# Patient Record
Sex: MALE | URBAN - METROPOLITAN AREA
[De-identification: names, ages, dates, MRNs, and addresses within clinical notes are randomized per-mention and may not be internally consistent; named-entity substitution may affect disease eponyms.]

---

## 2022-08-02 ENCOUNTER — APPOINTMENT (OUTPATIENT)
Dept: URBAN - METROPOLITAN AREA CLINIC 309 | Age: 57
Setting detail: DERMATOLOGY
End: 2022-08-03

## 2022-08-02 DIAGNOSIS — L91.0 HYPERTROPHIC SCAR: ICD-10-CM

## 2022-08-02 PROCEDURE — OTHER DEFER: OTHER

## 2022-08-02 PROCEDURE — OTHER COUNSELING: OTHER

## 2022-08-02 PROCEDURE — OTHER MIPS QUALITY: OTHER

## 2022-08-02 PROCEDURE — OTHER INTRALESIONAL KENALOG: OTHER

## 2022-08-02 PROCEDURE — 11900 INJECT SKIN LESIONS </W 7: CPT

## 2022-08-02 ASSESSMENT — LOCATION DETAILED DESCRIPTION DERM
LOCATION DETAILED: LEFT ANTERIOR EARLOBE
LOCATION DETAILED: LEFT POSTERIOR EARLOBE

## 2022-08-02 ASSESSMENT — LOCATION ZONE DERM: LOCATION ZONE: EAR

## 2022-08-02 ASSESSMENT — LOCATION SIMPLE DESCRIPTION DERM: LOCATION SIMPLE: LEFT EAR

## 2022-08-02 NOTE — PROCEDURE: INTRALESIONAL KENALOG
Which Kenalog Vial Was Used?: Kenalog 40 mg/ml (1 ml vial)
Detail Level: Detailed
How Many Mls Were Removed From The 40 Mg/Ml (1ml) Vial When Preparing The Injectable Solution?: 0
Total Volume Injected (Ccs- Only Use Numbers And Decimals): 2.0
Validate Note Data When Using Inventory: Yes
Total Volume Injected (Ccs- Only Use Numbers And Decimals): 1
Consent: The risks of atrophy were reviewed with the patient.
Concentration Of Solution Injected (Mg/Ml): 20.0
Medical Necessity Clause: This procedure was medically necessary because the lesions that were treated were:
Include Z78.9 (Other Specified Conditions Influencing Health Status) As An Associated Diagnosis?: No
Total Volume Injected (Ccs- Only Use Numbers And Decimals): 2
Administered By (Optional): Marjorie Esposito MD
Kenalog Preparation: Kenalog
How Many Mls Were Removed From The 40 Mg/Ml (1ml) Vial When Preparing The Injectable Solution?: 0.5
Kenalog Preparation: Kenalog with normal saline
Administered By (Optional): Marjorie Esposito MD

## 2022-08-02 NOTE — PROCEDURE: DEFER
Instructions (Optional): The plan is to perform ILTAC-20mg/mL x 2cc injected into the body of the keloid scar to shrink it over 1-3 treatments spread 4-6 weeks apart, THEN plan for excision with repair. 2 weeks after excision/repair, the pt is to RTC for another round of ILTAC injections to prevent keloid recurrence. After this final post-operative round of ILTAC, if the keloid returns, then will refer to [place] office for SRT. Instructions (Optional): The plan is to perform ILTAC-20mg/mL x 2cc injected into the body of the keloid scar to shrink it over 1-3 treatments spread 4-6 weeks apart, THEN plan for excision with repair. 2 weeks after excision/repair, the pt is to RTC for another round of ILTAC injections to prevent keloid recurrence. After this final post-operative round of ILTAC, if the keloid returns, then will refer to [place] office for SRT.

## 2022-08-30 ENCOUNTER — APPOINTMENT (OUTPATIENT)
Dept: URBAN - METROPOLITAN AREA CLINIC 309 | Age: 57
Setting detail: DERMATOLOGY
End: 2022-08-30

## 2022-08-30 DIAGNOSIS — L91.0 HYPERTROPHIC SCAR: ICD-10-CM

## 2022-08-30 PROCEDURE — 11900 INJECT SKIN LESIONS </W 7: CPT

## 2022-08-30 PROCEDURE — OTHER MIPS QUALITY: OTHER

## 2022-08-30 PROCEDURE — OTHER COUNSELING: OTHER

## 2022-08-30 PROCEDURE — OTHER INTRALESIONAL KENALOG: OTHER

## 2022-08-30 PROCEDURE — OTHER DEFER: OTHER

## 2022-08-30 ASSESSMENT — LOCATION ZONE DERM: LOCATION ZONE: EAR

## 2022-08-30 ASSESSMENT — LOCATION SIMPLE DESCRIPTION DERM: LOCATION SIMPLE: LEFT EAR

## 2022-08-30 ASSESSMENT — LOCATION DETAILED DESCRIPTION DERM
LOCATION DETAILED: LEFT ANTERIOR EARLOBE
LOCATION DETAILED: LEFT POSTERIOR EAR

## 2022-08-30 NOTE — PROCEDURE: MIPS QUALITY
Quality 431: Preventive Care And Screening: Unhealthy Alcohol Use - Screening: Patient not identified as an unhealthy alcohol user when screened for unhealthy alcohol use using a systematic screening method
Quality 134: Screening For Clinical Depression And Follow-Up Plan: The patient was screened for depression and the screen was negative and no follow up required
Quality 226: Preventive Care And Screening: Tobacco Use: Screening And Cessation Intervention: Patient screened for tobacco use and is an ex/non-smoker
Quality 110: Preventive Care And Screening: Influenza Immunization: Influenza immunization was not ordered or administered, reason not given
Quality 400a: One-Time Screening For Hepatitis C Virus (Hcv) For All Patients: Patient received one-time screening for HCV infection
Quality 130: Documentation Of Current Medications In The Medical Record: Current Medications Documented
Detail Level: Detailed

## 2022-08-30 NOTE — PROCEDURE: INTRALESIONAL KENALOG
Medical Necessity Clause: This procedure was medically necessary because the lesions that were treated were lesions of alopecia areata and the standard of care is ILTAC.

## 2022-11-01 ENCOUNTER — APPOINTMENT (OUTPATIENT)
Dept: URBAN - METROPOLITAN AREA CLINIC 309 | Age: 57
Setting detail: DERMATOLOGY
End: 2022-11-01

## 2022-11-01 DIAGNOSIS — L91.0 HYPERTROPHIC SCAR: ICD-10-CM

## 2022-11-01 PROCEDURE — OTHER MEDICATION COUNSELING: OTHER

## 2022-11-01 PROCEDURE — OTHER INTRALESIONAL KENALOG: OTHER

## 2022-11-01 PROCEDURE — OTHER COUNSELING: OTHER

## 2022-11-01 PROCEDURE — OTHER MIPS QUALITY: OTHER

## 2022-11-01 PROCEDURE — 11900 INJECT SKIN LESIONS </W 7: CPT

## 2022-11-01 PROCEDURE — OTHER DEFER: OTHER

## 2022-11-01 PROCEDURE — OTHER INJECTION: OTHER

## 2022-11-01 ASSESSMENT — LOCATION DETAILED DESCRIPTION DERM
LOCATION DETAILED: LEFT INFERIOR POSTAURICULAR SKIN
LOCATION DETAILED: LEFT POSTAURICULAR CREASE
LOCATION DETAILED: LEFT POSTERIOR EARLOBE
LOCATION DETAILED: LEFT POSTERIOR EAR

## 2022-11-01 ASSESSMENT — LOCATION ZONE DERM
LOCATION ZONE: EAR
LOCATION ZONE: SCALP

## 2022-11-01 ASSESSMENT — LOCATION SIMPLE DESCRIPTION DERM
LOCATION SIMPLE: LEFT EAR
LOCATION SIMPLE: POSTERIOR SCALP

## 2022-11-01 NOTE — PROCEDURE: MIPS QUALITY
Detail Level: Detailed
Quality 226: Preventive Care And Screening: Tobacco Use: Screening And Cessation Intervention: Patient screened for tobacco use and is an ex/non-smoker
Quality 431: Preventive Care And Screening: Unhealthy Alcohol Use - Screening: Patient not identified as an unhealthy alcohol user when screened for unhealthy alcohol use using a systematic screening method
Quality 400a: One-Time Screening For Hepatitis C Virus (Hcv) For All Patients: Patient received one-time screening for HCV infection
Quality 110: Preventive Care And Screening: Influenza Immunization: Influenza immunization was not ordered or administered, reason not given
Quality 130: Documentation Of Current Medications In The Medical Record: Current Medications Documented
Quality 134: Screening For Clinical Depression And Follow-Up Plan: The patient was screened for depression and the screen was negative and no follow up required

## 2022-11-01 NOTE — PROCEDURE: MEDICATION COUNSELING
Attending and PA/NP shared services statement (NON-critical care): Clindamycin Counseling: I counseled the patient regarding use of clindamycin as an antibiotic for prophylactic and/or therapeutic purposes. Clindamycin is active against numerous classes of bacteria, including skin bacteria. Side effects may include nausea, diarrhea, gastrointestinal upset, rash, hives, yeast infections, and in rare cases, colitis.

## 2022-11-01 NOTE — PROCEDURE: INJECTION
Additional Comments: Today, for the first time, 0.1cc of 5-FU was mixed with 0.9cc of ILTAC-40mg/mL and the whole 1cc of mixed solution was injected into the left posterior pinna keloid. It is the third round of intralesional injections with the goal of excising and repairing the deliberately torn earlobe at the next visit on a Wednesday morning.

## 2022-11-01 NOTE — PROCEDURE: MEDICATION COUNSELING
I called and left a message for patient to return my call.   Topical Clindamycin Counseling: Patient counseled that this medication may cause skin irritation or allergic reactions.  In the event of skin irritation, the patient was advised to reduce the amount of the drug applied or use it less frequently.   The patient verbalized understanding of the proper use and possible adverse effects of clindamycin.  All of the patient's questions and concerns were addressed.

## 2022-11-01 NOTE — PROCEDURE: INTRALESIONAL KENALOG
How Many Mls Were Removed From The 40 Mg/Ml (10ml) Vial When Preparing The Injectable Solution?: 0.9

## 2022-11-01 NOTE — PROCEDURE: DEFER
Instructions (Optional): The following procedure was deferred: Excision
X Size Of Lesion In Cm (Optional): 0
Detail Level: Detailed

## 2022-11-30 ENCOUNTER — APPOINTMENT (OUTPATIENT)
Dept: URBAN - METROPOLITAN AREA CLINIC 309 | Age: 57
Setting detail: DERMATOLOGY
End: 2022-11-30

## 2022-11-30 ENCOUNTER — RX ONLY (RX ONLY)
Age: 57
End: 2022-11-30

## 2022-11-30 DIAGNOSIS — L91.0 HYPERTROPHIC SCAR: ICD-10-CM

## 2022-11-30 PROCEDURE — OTHER COUNSELING: OTHER

## 2022-11-30 PROCEDURE — OTHER MIPS QUALITY: OTHER

## 2022-11-30 PROCEDURE — OTHER COSMETIC EXCISION: OTHER

## 2022-11-30 RX ORDER — DOXYCYCLINE HYCLATE 100 MG/1
TABLET, COATED ORAL
Qty: 10 | Refills: 0 | Status: CANCELLED | COMMUNITY
Start: 2022-11-30

## 2022-11-30 RX ORDER — DOXYCYCLINE 100 MG/1
TABLET, FILM COATED ORAL
Qty: 10 | Refills: 0 | Status: ERX | COMMUNITY
Start: 2022-11-30

## 2022-11-30 ASSESSMENT — LOCATION ZONE DERM: LOCATION ZONE: EAR

## 2022-11-30 ASSESSMENT — LOCATION DETAILED DESCRIPTION DERM: LOCATION DETAILED: LEFT ANTERIOR EARLOBE

## 2022-11-30 ASSESSMENT — LOCATION SIMPLE DESCRIPTION DERM: LOCATION SIMPLE: LEFT EAR

## 2022-11-30 NOTE — PROCEDURE: COSMETIC EXCISION
Post-Care Instructions: I reviewed with the patient in detail post-care instructions. Patient is not to engage in any heavy lifting, exercise, or swimming for the next 14 days. Should the patient develop any fevers, chills, bleeding, severe pain patient will contact the office immediately. Pt is to RTC for kenalog injections in after suture removal at about 9 days' time, and then every 1 month thereafter to help prevent recurrence.
Anesthesia Type: 1% lidocaine with epinephrine
Estimated Blood Loss (Cc): minimal
Size Of Lesion In Cm: 5
Excision Method: Fusiform
Dressing: dry sterile dressing
Epidermal Closure: simple interrupted
Epidermal Sutures: 5-0 Nylon
Anesthesia Volume In Cc: 9
Bill For Surgical Tray: no
Path Notes (To The Dermatopathologist): Please check margins.
Excision Depth: adipose tissue
Additional Anesthesia Volume In Cc: 6
Suture Removal: 9 days
Repair Type: Intermediate
Billing Type: Third-Party Bill
Hemostasis: Electrocautery
Detail Level: Detailed
Anesthesia Volume In Cc: 0
Render Post-Care Instructions In Note?: yes
Number Of Epidermal Sutures (Optional): 10
Consent was obtained from the patient. The risks and benefits to therapy were discussed in detail. Specifically, the risks of infection, scarring, bleeding, prolonged wound healing, incomplete removal, allergy to anesthesia, nerve injury and recurrence were addressed. Prior to the procedure, the treatment site was clearly identified and confirmed by the patient. All components of Universal Protocol/PAUSE Rule completed.
Size Of Margin In Cm: 0.2
Scalpel Size: 15 blade
Wound Care: Petrolatum
X Size Of Lesion In Cm (Optional): 3

## 2022-11-30 NOTE — HPI: COSMETIC PROCEDURE
Additional History: Patient presents today for cosmetic removal of a keloid on the left ear look. Patient has had three prior intralesional Kenalog injections into the keloid on the left ear lobe. No blood thinners, no pace makers

## 2022-11-30 NOTE — PROCEDURE: MIPS QUALITY
Quality 226: Preventive Care And Screening: Tobacco Use: Screening And Cessation Intervention: Patient screened for tobacco use and is an ex/non-smoker
Quality 130: Documentation Of Current Medications In The Medical Record: Current Medications Documented
Detail Level: Detailed
Quality 110: Preventive Care And Screening: Influenza Immunization: Influenza immunization was not ordered or administered, reason not given
Quality 134: Screening For Clinical Depression And Follow-Up Plan: The patient was screened for depression and the screen was negative and no follow up required
Quality 431: Preventive Care And Screening: Unhealthy Alcohol Use - Screening: Patient not identified as an unhealthy alcohol user when screened for unhealthy alcohol use using a systematic screening method
Quality 400a: One-Time Screening For Hepatitis C Virus (Hcv) For All Patients: Patient received one-time screening for HCV infection

## 2022-12-09 ENCOUNTER — APPOINTMENT (OUTPATIENT)
Dept: URBAN - METROPOLITAN AREA CLINIC 309 | Age: 57
Setting detail: DERMATOLOGY
End: 2022-12-09

## 2022-12-09 ENCOUNTER — APPOINTMENT (OUTPATIENT)
Dept: URBAN - METROPOLITAN AREA CLINIC 309 | Age: 57
Setting detail: DERMATOLOGY
End: 2022-12-10

## 2022-12-09 DIAGNOSIS — L90.5 SCAR CONDITIONS AND FIBROSIS OF SKIN: ICD-10-CM

## 2022-12-09 PROCEDURE — OTHER MIPS QUALITY: OTHER

## 2022-12-09 PROCEDURE — OTHER COUNSELING: OTHER

## 2022-12-09 PROCEDURE — OTHER INTRALESIONAL KENALOG: OTHER

## 2022-12-09 PROCEDURE — 11900 INJECT SKIN LESIONS </W 7: CPT

## 2022-12-09 ASSESSMENT — LOCATION SIMPLE DESCRIPTION DERM: LOCATION SIMPLE: LEFT EAR

## 2022-12-09 ASSESSMENT — LOCATION DETAILED DESCRIPTION DERM: LOCATION DETAILED: LEFT ANTERIOR EARLOBE

## 2022-12-09 ASSESSMENT — LOCATION ZONE DERM: LOCATION ZONE: EAR

## 2022-12-09 NOTE — PROCEDURE: MIPS QUALITY
Quality 431: Preventive Care And Screening: Unhealthy Alcohol Use - Screening: Patient not identified as an unhealthy alcohol user when screened for unhealthy alcohol use using a systematic screening method
Quality 400a: One-Time Screening For Hepatitis C Virus (Hcv) For All Patients: Patient received one-time screening for HCV infection
Quality 134: Screening For Clinical Depression And Follow-Up Plan: The patient was screened for depression and the screen was negative and no follow up required
Quality 226: Preventive Care And Screening: Tobacco Use: Screening And Cessation Intervention: Patient screened for tobacco use and is an ex/non-smoker
Quality 130: Documentation Of Current Medications In The Medical Record: Current Medications Documented
Detail Level: Detailed
Quality 110: Preventive Care And Screening: Influenza Immunization: Influenza immunization was not ordered or administered, reason not given

## 2022-12-09 NOTE — HPI: SUTURE REMOVAL
Additional History: Pt PResents today for a suture removal due to an excision of a keloid on the left ear lobe. Pt states he tolerated his doxycycline monohydrate 100mg PO BID well and denies any dizziness, upset stomach

## 2024-03-21 ENCOUNTER — APPOINTMENT (RX ONLY)
Dept: URBAN - METROPOLITAN AREA CLINIC 41 | Facility: CLINIC | Age: 59
Setting detail: DERMATOLOGY
End: 2024-03-21

## 2024-03-21 DIAGNOSIS — L91.0 HYPERTROPHIC SCAR: ICD-10-CM | Status: STABLE

## 2024-03-21 PROCEDURE — ? TREATMENT REGIMEN

## 2024-03-21 PROCEDURE — ? COUNSELING

## 2024-03-21 PROCEDURE — 99203 OFFICE O/P NEW LOW 30 MIN: CPT

## 2024-03-21 ASSESSMENT — LOCATION DETAILED DESCRIPTION DERM
LOCATION DETAILED: LEFT ANTERIOR EARLOBE
LOCATION DETAILED: LEFT POSTERIOR EAR

## 2024-03-21 ASSESSMENT — LOCATION SIMPLE DESCRIPTION DERM: LOCATION SIMPLE: LEFT EAR

## 2024-03-21 ASSESSMENT — LOCATION ZONE DERM: LOCATION ZONE: EAR

## 2024-03-21 NOTE — HPI: SCAR (KELOIDS)
Is This A New Presentation, Or A Follow-Up?: Scar
Additional History: New pt\\nKeloid on left ear\\nOriginally from ear piercing\\nHad surgically removed in past and keloid returned\\nInterested in tx

## 2024-03-21 NOTE — PROCEDURE: COUNSELING
Detail Level: Detailed
Patient Specific Counseling (Will Not Stick From Patient To Patient): -\\nBG states cutting it out again would be a challenge. Pt had previous surgery 9 months ago. BG states we could inject it but he does not think it will make a huge difference due to how thick it is. BG asks pt what bothers him about it and pt states it hurts especially when he lays on it. Pt states he would like it cut off.  BG asks pt which part hurts and pt states the posterior portion where BG touched. BG states this part might respond to ILK, pt would like to try.

## 2024-03-21 NOTE — PROCEDURE: TREATMENT REGIMEN
Detail Level: Zone
Plan: Intralesional Kenalog injection \\n\\nLesions Injected: 1\\nMedication Injected: 10.0 mg/cc of Kenalog\\nTotal Volume Injected: 0.6cc\\n\\[facility] #: [id number]\\nAdministered by: Dr. Lauri Ag\\n\\nThe risks of atrophy and possible hypo/hyperpigmentation were reviewed with the patient.

## 2024-04-24 ENCOUNTER — APPOINTMENT (RX ONLY)
Dept: URBAN - METROPOLITAN AREA CLINIC 41 | Facility: CLINIC | Age: 59
Setting detail: DERMATOLOGY
End: 2024-04-24

## 2024-04-24 DIAGNOSIS — L91.0 HYPERTROPHIC SCAR: ICD-10-CM | Status: STABLE

## 2024-04-24 PROCEDURE — ? COUNSELING

## 2024-04-24 PROCEDURE — ? TREATMENT REGIMEN

## 2024-04-24 PROCEDURE — 99213 OFFICE O/P EST LOW 20 MIN: CPT

## 2024-04-24 ASSESSMENT — LOCATION ZONE DERM: LOCATION ZONE: EAR

## 2024-04-24 ASSESSMENT — LOCATION SIMPLE DESCRIPTION DERM: LOCATION SIMPLE: LEFT EAR

## 2024-04-24 ASSESSMENT — LOCATION DETAILED DESCRIPTION DERM
LOCATION DETAILED: LEFT ANTERIOR EARLOBE
LOCATION DETAILED: LEFT POSTERIOR EAR

## 2024-04-24 NOTE — PROCEDURE: TREATMENT REGIMEN
Detail Level: Zone
Plan: Intralesional Kenalog injection \\n\\nLesions Injected: 1\\nMedication Injected: 10.0 mg/cc of Kenalog\\nTotal Volume Injected: 0.6cc\\n\\[facility] #: [id number]\\nAdministered by: Dr. Lauri Ag\\n\\nThe risks of atrophy and possible hypo/hyperpigmentation were reviewed with the patient.

## 2024-05-28 ENCOUNTER — APPOINTMENT (RX ONLY)
Dept: URBAN - METROPOLITAN AREA CLINIC 41 | Facility: CLINIC | Age: 59
Setting detail: DERMATOLOGY
End: 2024-05-28

## 2024-05-28 DIAGNOSIS — L91.0 HYPERTROPHIC SCAR: ICD-10-CM | Status: STABLE

## 2024-05-28 PROCEDURE — ? COUNSELING

## 2024-05-28 PROCEDURE — 99213 OFFICE O/P EST LOW 20 MIN: CPT

## 2024-05-28 PROCEDURE — ? TREATMENT REGIMEN

## 2024-05-28 ASSESSMENT — LOCATION DETAILED DESCRIPTION DERM
LOCATION DETAILED: LEFT POSTERIOR EAR
LOCATION DETAILED: LEFT ANTERIOR EARLOBE

## 2024-05-28 ASSESSMENT — LOCATION SIMPLE DESCRIPTION DERM: LOCATION SIMPLE: LEFT EAR

## 2024-05-28 ASSESSMENT — LOCATION ZONE DERM: LOCATION ZONE: EAR

## 2024-05-28 NOTE — PROCEDURE: TREATMENT REGIMEN
Detail Level: Zone
Plan: Intralesional Kenalog injection \\n\\nLesions Injected: 1\\nMedication Injected: 10.0 mg/cc of Kenalog\\nTotal Volume Injected: 0.6cc\\n\\[facility] #: [id number]\\nAdministered by: Dr. Lauri Ag\\n\\nThe risks of atrophy and possible hypo/hyperpigmentation were reviewed with the patient.

## 2024-07-02 ENCOUNTER — APPOINTMENT (RX ONLY)
Dept: URBAN - METROPOLITAN AREA CLINIC 41 | Facility: CLINIC | Age: 59
Setting detail: DERMATOLOGY
End: 2024-07-02

## 2024-07-02 DIAGNOSIS — L91.0 HYPERTROPHIC SCAR: ICD-10-CM | Status: STABLE

## 2024-07-02 PROCEDURE — 99213 OFFICE O/P EST LOW 20 MIN: CPT

## 2024-07-02 PROCEDURE — ? COUNSELING

## 2024-07-02 PROCEDURE — ? TREATMENT REGIMEN

## 2024-07-02 ASSESSMENT — LOCATION SIMPLE DESCRIPTION DERM: LOCATION SIMPLE: LEFT EAR

## 2024-07-02 ASSESSMENT — LOCATION ZONE DERM: LOCATION ZONE: EAR

## 2024-07-02 ASSESSMENT — LOCATION DETAILED DESCRIPTION DERM
LOCATION DETAILED: LEFT POSTERIOR EAR
LOCATION DETAILED: LEFT ANTERIOR EARLOBE

## 2024-07-02 NOTE — PROCEDURE: TREATMENT REGIMEN
Detail Level: Zone
Plan: Intralesional Kenalog injection \\n\\nLesions Injected: 1\\nMedication Injected: 10.0 mg/cc of Kenalog\\nTotal Volume Injected: 0.4cc\\n\\[facility] #: [id number]\\nAdministered by: Dr. Lauri Ag\\n\\nThe risks of atrophy and possible hypo/hyperpigmentation were reviewed with the patient.

## 2024-08-12 ENCOUNTER — APPOINTMENT (RX ONLY)
Dept: URBAN - METROPOLITAN AREA CLINIC 40 | Facility: CLINIC | Age: 59
Setting detail: DERMATOLOGY
End: 2024-08-12

## 2024-08-12 DIAGNOSIS — L91.0 HYPERTROPHIC SCAR: ICD-10-CM

## 2024-08-12 DIAGNOSIS — R20.2 PARESTHESIA OF SKIN: ICD-10-CM | Status: STABLE

## 2024-08-12 PROCEDURE — ? DIAGNOSIS COMMENT

## 2024-08-12 PROCEDURE — ? INTRALESIONAL KENALOG

## 2024-08-12 PROCEDURE — ? COUNSELING

## 2024-08-12 PROCEDURE — 11900 INJECT SKIN LESIONS </W 7: CPT

## 2024-08-12 PROCEDURE — ? PRESCRIPTION MEDICATION MANAGEMENT

## 2024-08-12 PROCEDURE — 99213 OFFICE O/P EST LOW 20 MIN: CPT | Mod: 25

## 2024-08-12 PROCEDURE — ? PRESCRIPTION

## 2024-08-12 RX ORDER — MOMETASONE FUROATE 1 MG/G
OINTMENT TOPICAL
Qty: 15 | Refills: 0 | Status: ERX | COMMUNITY
Start: 2024-08-12

## 2024-08-12 RX ADMIN — MOMETASONE FUROATE: 1 OINTMENT TOPICAL at 00:00

## 2024-08-12 ASSESSMENT — LOCATION ZONE DERM: LOCATION ZONE: EAR

## 2024-08-12 ASSESSMENT — LOCATION DETAILED DESCRIPTION DERM
LOCATION DETAILED: LEFT ANTITRAGUS
LOCATION DETAILED: LEFT ANTERIOR EARLOBE

## 2024-08-12 ASSESSMENT — LOCATION SIMPLE DESCRIPTION DERM: LOCATION SIMPLE: LEFT EAR

## 2024-08-12 NOTE — PROCEDURE: PRESCRIPTION MEDICATION MANAGEMENT
Render In Strict Bullet Format?: No
Initiate Treatment: -mometasone ointment: apply to AA on ear qhs
Detail Level: Zone

## 2024-08-12 NOTE — PROCEDURE: DIAGNOSIS COMMENT
Comment: Hx of ILK+5FU inj. Previously excised, recurred larger than before roughly 5 mos s/p excision. Pt now c/o tenderness.
Detail Level: Simple
Render Risk Assessment In Note?: no

## 2024-08-12 NOTE — PROCEDURE: INTRALESIONAL KENALOG
Concentration Of Kenalog Solution Injected (Mg/Ml): 40.0
Total Volume (Ccs): 0.5
Require Ndc Code?: No
Detail Level: Detailed
How Many Mls Were Removed From The 80 Mg/Ml (5ml) Vial When Preparing The Injectable Solution?: 0
Medical Necessity Clause: This procedure was medically necessary because the lesions that were treated were:
Ndc# For Kenalog Only: [address]-3
Lot # For Kenalog (Optional): [id number]
Administered By (Optional): JAX
Consent: The risks of atrophy and hypopigmentation were reviewed with the patient.
Expiration Date For Kenalog (Optional): 03/2025
Kenalog Type Of Vial: Multiple Dose
Validate Note Data When Using Inventory: Yes
Kenalog Preparation: Kenalog with normal saline

## 2024-09-19 ENCOUNTER — APPOINTMENT (RX ONLY)
Dept: URBAN - METROPOLITAN AREA CLINIC 40 | Facility: CLINIC | Age: 59
Setting detail: DERMATOLOGY
End: 2024-09-19

## 2024-09-19 DIAGNOSIS — L91.0 HYPERTROPHIC SCAR: ICD-10-CM

## 2024-09-19 DIAGNOSIS — R20.2 PARESTHESIA OF SKIN: ICD-10-CM | Status: IMPROVED

## 2024-09-19 PROCEDURE — ? INTRALESIONAL KENALOG

## 2024-09-19 PROCEDURE — ? PRESCRIPTION

## 2024-09-19 PROCEDURE — 11900 INJECT SKIN LESIONS </W 7: CPT

## 2024-09-19 PROCEDURE — 99212 OFFICE O/P EST SF 10 MIN: CPT | Mod: 25

## 2024-09-19 PROCEDURE — ? COUNSELING

## 2024-09-19 PROCEDURE — ? PRESCRIPTION MEDICATION MANAGEMENT

## 2024-09-19 RX ORDER — MOMETASONE FUROATE 1 MG/G
OINTMENT TOPICAL
Qty: 45 | Refills: 0 | Status: ERX

## 2024-09-19 ASSESSMENT — LOCATION DETAILED DESCRIPTION DERM
LOCATION DETAILED: LEFT ANTITRAGUS
LOCATION DETAILED: LEFT ANTERIOR EARLOBE

## 2024-09-19 ASSESSMENT — LOCATION ZONE DERM: LOCATION ZONE: EAR

## 2024-09-19 ASSESSMENT — LOCATION SIMPLE DESCRIPTION DERM: LOCATION SIMPLE: LEFT EAR

## 2024-09-19 NOTE — PROCEDURE: PRESCRIPTION MEDICATION MANAGEMENT
Render In Strict Bullet Format?: No
Continue Regimen: -mometasone ointment: apply to AA on ear qhs
Detail Level: Zone

## 2024-09-19 NOTE — PROCEDURE: INTRALESIONAL KENALOG
Treatment Number (Optional): 2
Concentration Of Kenalog Solution Injected (Mg/Ml): 40.0
Total Volume (Ccs): 0.6
Require Ndc Code?: No
Detail Level: Detailed
How Many Mls Were Removed From The 80 Mg/Ml (5ml) Vial When Preparing The Injectable Solution?: 0
Medical Necessity Clause: This procedure was medically necessary because the lesions that were treated were:
Ndc# For Kenalog Only: 84346-352-34
Lot # For Kenalog (Optional): [id number]
Administered By (Optional): JAX
Consent: The risks of atrophy and hypopigmentation were reviewed with the patient.
Expiration Date For Kenalog (Optional): 
Kenalog Type Of Vial: Multiple Dose
Validate Note Data When Using Inventory: Yes
Kenalog Preparation: Kenalog with normal saline

## 2024-10-17 ENCOUNTER — APPOINTMENT (RX ONLY)
Dept: URBAN - METROPOLITAN AREA CLINIC 40 | Facility: CLINIC | Age: 59
Setting detail: DERMATOLOGY
End: 2024-10-17

## 2024-10-17 DIAGNOSIS — R20.2 PARESTHESIA OF SKIN: ICD-10-CM | Status: STABLE

## 2024-10-17 DIAGNOSIS — L91.0 HYPERTROPHIC SCAR: ICD-10-CM | Status: INADEQUATELY CONTROLLED

## 2024-10-17 PROCEDURE — ? COUNSELING

## 2024-10-17 PROCEDURE — ? INTRALESIONAL KENALOG

## 2024-10-17 PROCEDURE — 99212 OFFICE O/P EST SF 10 MIN: CPT | Mod: 25

## 2024-10-17 PROCEDURE — ? PRESCRIPTION MEDICATION MANAGEMENT

## 2024-10-17 PROCEDURE — 11900 INJECT SKIN LESIONS </W 7: CPT

## 2024-10-17 ASSESSMENT — LOCATION ZONE DERM: LOCATION ZONE: EAR

## 2024-10-17 ASSESSMENT — LOCATION SIMPLE DESCRIPTION DERM: LOCATION SIMPLE: LEFT EAR

## 2024-10-17 ASSESSMENT — LOCATION DETAILED DESCRIPTION DERM
LOCATION DETAILED: LEFT ANTITRAGUS
LOCATION DETAILED: LEFT ANTERIOR EARLOBE

## 2024-10-17 NOTE — PROCEDURE: INTRALESIONAL KENALOG
Treatment Number (Optional): 3
Concentration Of Kenalog Solution Injected (Mg/Ml): 40.0
Total Volume (Ccs): 1.0
Require Ndc Code?: No
Detail Level: Detailed
How Many Mls Were Removed From The 80 Mg/Ml (5ml) Vial When Preparing The Injectable Solution?: 0
Medical Necessity Clause: This procedure was medically necessary because the lesions that were treated were:
Ndc# For Kenalog Only: 97066-326-03
Lot # For Kenalog (Optional): [id number]
Administered By (Optional): JAX
Consent: The risks of atrophy and hypopigmentation were reviewed with the patient.
Which Kenalog Vial Was Used?: Kenalog 40 mg/ml (5 ml vial)
Expiration Date For Kenalog (Optional): 
Kenalog Type Of Vial: Multiple Dose
Validate Note Data When Using Inventory: Yes
Kenalog Preparation: Kenalog with normal saline

## 2024-11-11 ENCOUNTER — APPOINTMENT (RX ONLY)
Dept: URBAN - METROPOLITAN AREA CLINIC 40 | Facility: CLINIC | Age: 59
Setting detail: DERMATOLOGY
End: 2024-11-11

## 2024-11-11 DIAGNOSIS — L91.0 HYPERTROPHIC SCAR: ICD-10-CM

## 2024-11-11 DIAGNOSIS — R20.2 PARESTHESIA OF SKIN: ICD-10-CM | Status: IMPROVED

## 2024-11-11 PROCEDURE — 11900 INJECT SKIN LESIONS </W 7: CPT

## 2024-11-11 PROCEDURE — ? DIAGNOSIS COMMENT

## 2024-11-11 PROCEDURE — ? PRESCRIPTION MEDICATION MANAGEMENT

## 2024-11-11 PROCEDURE — ? INTRALESIONAL KENALOG

## 2024-11-11 PROCEDURE — 99212 OFFICE O/P EST SF 10 MIN: CPT | Mod: 25

## 2024-11-11 PROCEDURE — ? COUNSELING

## 2024-11-11 ASSESSMENT — LOCATION SIMPLE DESCRIPTION DERM: LOCATION SIMPLE: LEFT EAR

## 2024-11-11 ASSESSMENT — LOCATION ZONE DERM: LOCATION ZONE: EAR

## 2024-11-11 ASSESSMENT — LOCATION DETAILED DESCRIPTION DERM
LOCATION DETAILED: LEFT ANTITRAGUS
LOCATION DETAILED: LEFT ANTERIOR EARLOBE

## 2024-11-11 NOTE — PROCEDURE: INTRALESIONAL KENALOG
Treatment Number (Optional): 4
Concentration Of Kenalog Solution Injected (Mg/Ml): 40.0
Total Volume (Ccs): 0.5
Require Ndc Code?: No
Detail Level: Detailed
How Many Mls Were Removed From The 80 Mg/Ml (5ml) Vial When Preparing The Injectable Solution?: 0
Medical Necessity Clause: This procedure was medically necessary because the lesions that were treated were:
Ndc# For Kenalog Only: 89749-230-69
Lot # For Kenalog (Optional): [id number]
Administered By (Optional): JAX
Consent: The risks of atrophy and hypopigmentation were reviewed with the patient.
Which Kenalog Vial Was Used?: Kenalog 40 mg/ml (5 ml vial)
Expiration Date For Kenalog (Optional): 
Kenalog Type Of Vial: Multiple Dose
Validate Note Data When Using Inventory: Yes
Kenalog Preparation: Kenalog with normal saline

## 2024-11-11 NOTE — PROCEDURE: DIAGNOSIS COMMENT
Detail Level: Simple
Render Risk Assessment In Note?: no
Comment: Pt contacted insurance regarding SRT. They informed him they would cover. Pt referred to Dr. Jordan Peterson in [place] for Tx.

## 2024-12-12 ENCOUNTER — APPOINTMENT (OUTPATIENT)
Dept: URBAN - METROPOLITAN AREA CLINIC 40 | Facility: CLINIC | Age: 59
Setting detail: DERMATOLOGY
End: 2024-12-12

## 2024-12-12 DIAGNOSIS — L91.0 HYPERTROPHIC SCAR: ICD-10-CM

## 2024-12-12 DIAGNOSIS — R20.2 PARESTHESIA OF SKIN: ICD-10-CM | Status: STABLE

## 2024-12-12 PROCEDURE — 99212 OFFICE O/P EST SF 10 MIN: CPT | Mod: 25

## 2024-12-12 PROCEDURE — ? INTRALESIONAL KENALOG

## 2024-12-12 PROCEDURE — ? DIAGNOSIS COMMENT

## 2024-12-12 PROCEDURE — ? COUNSELING

## 2024-12-12 PROCEDURE — 11900 INJECT SKIN LESIONS </W 7: CPT

## 2024-12-12 PROCEDURE — ? PRESCRIPTION MEDICATION MANAGEMENT

## 2024-12-12 ASSESSMENT — LOCATION SIMPLE DESCRIPTION DERM: LOCATION SIMPLE: LEFT EAR

## 2024-12-12 ASSESSMENT — LOCATION ZONE DERM: LOCATION ZONE: EAR

## 2024-12-12 ASSESSMENT — LOCATION DETAILED DESCRIPTION DERM
LOCATION DETAILED: LEFT ANTITRAGUS
LOCATION DETAILED: LEFT ANTERIOR EARLOBE

## 2024-12-12 NOTE — PROCEDURE: DIAGNOSIS COMMENT
Detail Level: Simple
Render Risk Assessment In Note?: no
Comment: Pt contacted insurance regarding SRT. They informed him they would cover. Pt referred to Dr. Jordan Peterson in [place] for Tx.

## 2024-12-12 NOTE — PROCEDURE: INTRALESIONAL KENALOG
Treatment Number (Optional): 5
Concentration Of Kenalog Solution Injected (Mg/Ml): 40.0
Total Volume (Ccs): 0.6
Require Ndc Code?: No
Detail Level: Detailed
How Many Mls Were Removed From The 80 Mg/Ml (5ml) Vial When Preparing The Injectable Solution?: 0
Medical Necessity Clause: This procedure was medically necessary because the lesions that were treated were:
Ndc# For Kenalog Only: 11388-847-93
Lot # For Kenalog (Optional): [id number]
Administered By (Optional): JAX
Consent: The risks of atrophy and hypopigmentation were reviewed with the patient.
Which Kenalog Vial Was Used?: Kenalog 40 mg/ml (5 ml vial)
Expiration Date For Kenalog (Optional): 
Kenalog Type Of Vial: Multiple Dose
Validate Note Data When Using Inventory: Yes
Kenalog Preparation: Kenalog with normal saline

## 2025-01-23 ENCOUNTER — APPOINTMENT (OUTPATIENT)
Dept: URBAN - METROPOLITAN AREA CLINIC 40 | Facility: CLINIC | Age: 60
Setting detail: DERMATOLOGY
End: 2025-01-23

## 2025-01-23 DIAGNOSIS — L91.0 HYPERTROPHIC SCAR: ICD-10-CM

## 2025-01-23 DIAGNOSIS — R20.2 PARESTHESIA OF SKIN: ICD-10-CM | Status: STABLE

## 2025-01-23 PROCEDURE — ? COUNSELING

## 2025-01-23 PROCEDURE — ? ADDITIONAL NOTES

## 2025-01-23 PROCEDURE — ? INTRALESIONAL KENALOG

## 2025-01-23 PROCEDURE — ? PRESCRIPTION MEDICATION MANAGEMENT

## 2025-01-23 PROCEDURE — ? PHOTO-DOCUMENTATION

## 2025-01-23 PROCEDURE — ? DIAGNOSIS COMMENT

## 2025-01-23 PROCEDURE — 99212 OFFICE O/P EST SF 10 MIN: CPT | Mod: 25

## 2025-01-23 PROCEDURE — 11900 INJECT SKIN LESIONS </W 7: CPT

## 2025-01-23 ASSESSMENT — LOCATION ZONE DERM: LOCATION ZONE: EAR

## 2025-01-23 ASSESSMENT — LOCATION DETAILED DESCRIPTION DERM
LOCATION DETAILED: LEFT ANTERIOR EARLOBE
LOCATION DETAILED: LEFT ANTITRAGUS

## 2025-01-23 ASSESSMENT — LOCATION SIMPLE DESCRIPTION DERM: LOCATION SIMPLE: LEFT EAR

## 2025-01-23 NOTE — PROCEDURE: ADDITIONAL NOTES
Additional Notes: Pt was advised to hold off of mometasone as he is starting to experience hypopigmentation on his left ear. Pt was recommended to use OTC treatments such as SARNA lotion to alleviate any itching if needed.
Render Risk Assessment In Note?: no
Detail Level: Simple

## 2025-01-23 NOTE — PROCEDURE: INTRALESIONAL KENALOG
Treatment Number (Optional): 6
Concentration Of Kenalog Solution Injected (Mg/Ml): 20.0
Total Volume (Ccs): 0.2
Require Ndc Code?: No
Detail Level: Detailed
How Many Mls Were Removed From The 80 Mg/Ml (5ml) Vial When Preparing The Injectable Solution?: 0
Medical Necessity Clause: This procedure was medically necessary because the lesions that were treated were:
Ndc# For Kenalog Only: [address]0625-903-40
Lot # For Kenalog (Optional): [id number]
Administered By (Optional): JAX
Consent: The risks of atrophy and hypopigmentation were reviewed with the patient.
Which Kenalog Vial Was Used?: Kenalog 40 mg/ml (5 ml vial)
Expiration Date For Kenalog (Optional): 
Kenalog Type Of Vial: Multiple Dose
Validate Note Data When Using Inventory: Yes
Kenalog Preparation: Kenalog with normal saline

## 2025-01-23 NOTE — PROCEDURE: DIAGNOSIS COMMENT
Detail Level: Simple
Render Risk Assessment In Note?: no
Comment: Pt plans on initiating SRT on May 25th 2025. discussed hypopigmentation from ILK/TCS. pt would like to continue injections as the firmness of the keloid bothers him more than the hypopigmentation.

## 2025-01-23 NOTE — PROCEDURE: PRESCRIPTION MEDICATION MANAGEMENT
Render In Strict Bullet Format?: No
Discontinue Regimen: mometasone ointment: apply to AA on ear qhs
Detail Level: Zone

## 2025-03-03 ENCOUNTER — APPOINTMENT (OUTPATIENT)
Dept: URBAN - METROPOLITAN AREA CLINIC 40 | Facility: CLINIC | Age: 60
Setting detail: DERMATOLOGY
End: 2025-03-03

## 2025-03-03 DIAGNOSIS — R20.2 PARESTHESIA OF SKIN: ICD-10-CM | Status: IMPROVED

## 2025-03-03 DIAGNOSIS — L91.0 HYPERTROPHIC SCAR: ICD-10-CM

## 2025-03-03 PROCEDURE — ? PRESCRIPTION MEDICATION MANAGEMENT

## 2025-03-03 PROCEDURE — ? PHOTO-DOCUMENTATION

## 2025-03-03 PROCEDURE — ? INTRALESIONAL KENALOG

## 2025-03-03 PROCEDURE — ? DIAGNOSIS COMMENT

## 2025-03-03 PROCEDURE — 11900 INJECT SKIN LESIONS </W 7: CPT

## 2025-03-03 PROCEDURE — 99212 OFFICE O/P EST SF 10 MIN: CPT | Mod: 25

## 2025-03-03 PROCEDURE — ? COUNSELING

## 2025-03-03 ASSESSMENT — LOCATION SIMPLE DESCRIPTION DERM: LOCATION SIMPLE: LEFT EAR

## 2025-03-03 ASSESSMENT — LOCATION ZONE DERM: LOCATION ZONE: EAR

## 2025-03-03 ASSESSMENT — LOCATION DETAILED DESCRIPTION DERM
LOCATION DETAILED: LEFT ANTITRAGUS
LOCATION DETAILED: LEFT ANTERIOR EARLOBE

## 2025-03-03 NOTE — PROCEDURE: INTRALESIONAL KENALOG
Treatment Number (Optional): 7
Concentration Of Kenalog Solution Injected (Mg/Ml): 20.0
Total Volume (Ccs): 0.3
Require Ndc Code?: No
Detail Level: Detailed
How Many Mls Were Removed From The 80 Mg/Ml (5ml) Vial When Preparing The Injectable Solution?: 0
Medical Necessity Clause: This procedure was medically necessary because the lesions that were treated were:
Ndc# For Kenalog Only: [address]-3
Lot # For Kenalog (Optional): [id number]
Administered By (Optional): JAX
Consent: The risks of atrophy and hypopigmentation were reviewed with the patient.
Which Kenalog Vial Was Used?: Kenalog 40 mg/ml (5 ml vial)
Expiration Date For Kenalog (Optional): 12/2025
Kenalog Type Of Vial: Multiple Dose
Validate Note Data When Using Inventory: Yes
Kenalog Preparation: Kenalog with normal saline

## 2025-03-03 NOTE — PROCEDURE: PRESCRIPTION MEDICATION MANAGEMENT
Render In Strict Bullet Format?: No
Continue Regimen: sarna anti-itch prn
Discontinue Regimen: mometasone ointment: apply to AA on ear qhs
Detail Level: Zone

## 2025-03-03 NOTE — PROCEDURE: DIAGNOSIS COMMENT
Detail Level: Simple
Render Risk Assessment In Note?: no
Comment: insurance denied XRT. Will refer to Dr. Vogel or Dr. Grimaldo for excision. pt ok with commuting to  for excision and f/u.

## 2025-04-07 ENCOUNTER — APPOINTMENT (OUTPATIENT)
Dept: URBAN - METROPOLITAN AREA CLINIC 40 | Facility: CLINIC | Age: 60
Setting detail: DERMATOLOGY
End: 2025-04-07

## 2025-04-07 DIAGNOSIS — R20.2 PARESTHESIA OF SKIN: ICD-10-CM | Status: STABLE

## 2025-04-07 DIAGNOSIS — L91.0 HYPERTROPHIC SCAR: ICD-10-CM

## 2025-04-07 PROCEDURE — ? COUNSELING

## 2025-04-07 PROCEDURE — 99212 OFFICE O/P EST SF 10 MIN: CPT | Mod: 25

## 2025-04-07 PROCEDURE — ? DIAGNOSIS COMMENT

## 2025-04-07 PROCEDURE — ? PRESCRIPTION MEDICATION MANAGEMENT

## 2025-04-07 PROCEDURE — ? PHOTO-DOCUMENTATION

## 2025-04-07 PROCEDURE — 11900 INJECT SKIN LESIONS </W 7: CPT

## 2025-04-07 PROCEDURE — ? INTRALESIONAL KENALOG

## 2025-04-07 ASSESSMENT — LOCATION DETAILED DESCRIPTION DERM
LOCATION DETAILED: LEFT ANTERIOR EARLOBE
LOCATION DETAILED: LEFT ANTITRAGUS

## 2025-04-07 ASSESSMENT — LOCATION SIMPLE DESCRIPTION DERM: LOCATION SIMPLE: LEFT EAR

## 2025-04-07 ASSESSMENT — LOCATION ZONE DERM: LOCATION ZONE: EAR

## 2025-04-07 NOTE — PROCEDURE: INTRALESIONAL KENALOG
Treatment Number (Optional): 8
Concentration Of Kenalog Solution Injected (Mg/Ml): 40.0
Total Volume (Ccs): 0.3
Require Ndc Code?: No
Detail Level: Detailed
How Many Mls Were Removed From The 80 Mg/Ml (5ml) Vial When Preparing The Injectable Solution?: 0
Medical Necessity Clause: This procedure was medically necessary because the lesions that were treated were:
Ndc# For Kenalog Only: 42174-958-05
Lot # For Kenalog (Optional): [id number]
Administered By (Optional): JAX
Consent: The risks of atrophy and hypopigmentation were reviewed with the patient.
Which Kenalog Vial Was Used?: Kenalog 40 mg/ml (5 ml vial)
Expiration Date For Kenalog (Optional): 05/2026
Kenalog Type Of Vial: Multiple Dose
Validate Note Data When Using Inventory: Yes
Kenalog Preparation: Kenalog with normal saline

## 2025-04-07 NOTE — PROCEDURE: DIAGNOSIS COMMENT
Detail Level: Simple
Render Risk Assessment In Note?: no
Comment: Referred to Dr. Howard for excision.